# Patient Record
Sex: MALE | Race: WHITE | Employment: FULL TIME | URBAN - METROPOLITAN AREA
[De-identification: names, ages, dates, MRNs, and addresses within clinical notes are randomized per-mention and may not be internally consistent; named-entity substitution may affect disease eponyms.]

---

## 2022-12-10 ENCOUNTER — OFFICE VISIT (OUTPATIENT)
Dept: URGENT CARE | Age: 42
End: 2022-12-10

## 2022-12-10 VITALS
TEMPERATURE: 99 F | HEIGHT: 71 IN | OXYGEN SATURATION: 99 % | SYSTOLIC BLOOD PRESSURE: 122 MMHG | BODY MASS INDEX: 22.68 KG/M2 | DIASTOLIC BLOOD PRESSURE: 80 MMHG | RESPIRATION RATE: 17 BRPM | WEIGHT: 162 LBS | HEART RATE: 72 BPM

## 2022-12-10 DIAGNOSIS — B96.89 ACUTE BACTERIAL SINUSITIS: Primary | ICD-10-CM

## 2022-12-10 DIAGNOSIS — J01.90 ACUTE BACTERIAL SINUSITIS: Primary | ICD-10-CM

## 2022-12-10 RX ORDER — DOXYCYCLINE HYCLATE 100 MG
100 TABLET ORAL 2 TIMES DAILY
Qty: 14 TABLET | Refills: 0 | Status: SHIPPED | OUTPATIENT
Start: 2022-12-10 | End: 2022-12-17

## 2022-12-10 ASSESSMENT — ENCOUNTER SYMPTOMS
COUGH: 1
SINUS PRESSURE: 1
EYE PAIN: 0
DIARRHEA: 0
SINUS PAIN: 1
VOMITING: 0
NAUSEA: 0

## 2022-12-10 NOTE — PROGRESS NOTES
Elizabeth James (:  1980) is a 38 y.o. male,New patient, here for evaluation of the following chief complaint(s):  Sinusitis (drainage) and Pharyngitis (Face sore and teeth mucus green)      ASSESSMENT/PLAN:    ICD-10-CM    1. Acute bacterial sinusitis  J01.90 doxycycline hyclate (VIBRA-TABS) 100 MG tablet    B96.89         Ibuprofen as needed  Continue benadryl as needed   Return if symptoms worsen or fail to improve. SUBJECTIVE/OBJECTIVE:  38year old male presents with c/o sinus pressure and PND for 1 week. States that his symptoms worsened in the past 24 hours with more facial and teeth pressure. He has loss of voice. Denies known sick exposure. Denies known fever, has mild scratchy throat. He has been treating with benadryl and OTC sinus medication - no Ibuprofen in the past 24 hours. He was last treated with ATB more than 6 months. At home tested for COVID twice that was negative in the past week. History provided by:  Patient   used: No    Pharyngitis  Associated symptoms: congestion, cough, fatigue and headaches    Associated symptoms: no chest pain, no diarrhea, no ear pain, no nausea and no vomiting      Vitals:    12/10/22 0920   BP: 122/80   Site: Right Upper Arm   Position: Sitting   Cuff Size: Large Adult   Pulse: 72   Resp: 17   Temp: 99 °F (37.2 °C)   TempSrc: Oral   SpO2: 99%   Weight: 162 lb (73.5 kg)   Height: 5' 11\" (1.803 m)       Review of Systems   Constitutional:  Positive for fatigue. Negative for activity change and appetite change. HENT:  Positive for congestion, sinus pressure and sinus pain. Negative for ear pain. Mucoid green sinus drainage   Eyes:  Negative for pain. Respiratory:  Positive for cough. Non-productive cough in the  morning   Cardiovascular:  Negative for chest pain. Gastrointestinal:  Negative for diarrhea, nausea and vomiting. Neurological:  Positive for headaches.      Physical Exam  Constitutional: Appearance: Normal appearance. HENT:      Head: Normocephalic. Right Ear: Tympanic membrane, ear canal and external ear normal.      Left Ear: Tympanic membrane, ear canal and external ear normal.      Nose: Congestion present. Comments: Mucous membranes edematous and erythematous  Positive facial tenderness at maxillary sinus area     Mouth/Throat:      Mouth: Mucous membranes are dry. Pharynx: Posterior oropharyngeal erythema present. No oropharyngeal exudate. Eyes:      Pupils: Pupils are equal, round, and reactive to light. Cardiovascular:      Rate and Rhythm: Normal rate and regular rhythm. Pulses: Normal pulses. Heart sounds: Murmur heard. Pulmonary:      Effort: Pulmonary effort is normal. No respiratory distress. Breath sounds: Normal breath sounds. No wheezing. Musculoskeletal:      Cervical back: Normal range of motion and neck supple. Skin:     General: Skin is warm and dry. Neurological:      Mental Status: He is alert. Psychiatric:         Mood and Affect: Mood normal.         An electronic signature was used to authenticate this note.     --John Wang, MARISA - CNP